# Patient Record
Sex: MALE | Race: WHITE
[De-identification: names, ages, dates, MRNs, and addresses within clinical notes are randomized per-mention and may not be internally consistent; named-entity substitution may affect disease eponyms.]

---

## 2020-03-19 ENCOUNTER — HOSPITAL ENCOUNTER (OUTPATIENT)
Dept: HOSPITAL 88 - OR | Age: 60
Discharge: HOME | End: 2020-03-19
Attending: INTERNAL MEDICINE
Payer: COMMERCIAL

## 2020-03-19 VITALS — DIASTOLIC BLOOD PRESSURE: 66 MMHG | SYSTOLIC BLOOD PRESSURE: 106 MMHG

## 2020-03-19 DIAGNOSIS — K64.8: ICD-10-CM

## 2020-03-19 DIAGNOSIS — K62.1: ICD-10-CM

## 2020-03-19 DIAGNOSIS — G47.33: ICD-10-CM

## 2020-03-19 DIAGNOSIS — D12.3: ICD-10-CM

## 2020-03-19 DIAGNOSIS — I10: ICD-10-CM

## 2020-03-19 DIAGNOSIS — F17.210: ICD-10-CM

## 2020-03-19 DIAGNOSIS — K57.30: ICD-10-CM

## 2020-03-19 DIAGNOSIS — K59.00: ICD-10-CM

## 2020-03-19 DIAGNOSIS — Z01.810: ICD-10-CM

## 2020-03-19 DIAGNOSIS — M54.2: ICD-10-CM

## 2020-03-19 DIAGNOSIS — M54.9: ICD-10-CM

## 2020-03-19 DIAGNOSIS — Z09: Primary | ICD-10-CM

## 2020-03-19 DIAGNOSIS — F32.9: ICD-10-CM

## 2020-03-19 PROCEDURE — 93005 ELECTROCARDIOGRAM TRACING: CPT

## 2020-03-19 PROCEDURE — 45384 COLONOSCOPY W/LESION REMOVAL: CPT

## 2020-03-19 PROCEDURE — 45378 DIAGNOSTIC COLONOSCOPY: CPT

## 2020-03-19 PROCEDURE — 45385 COLONOSCOPY W/LESION REMOVAL: CPT

## 2020-03-19 NOTE — OPERATIVE REPORT
DATE OF PROCEDURE:  03/19/2020

 

SURGEON:  Carlos Schultz MD

 

PROCEDURE:  Colonoscopy with polypectomy.

 

INDICATIONS FOR PROCEDURE:  Surveillance colonoscopy, personal history of colon polyps.

 

MEDICATIONS:  The patient was done under MAC, please see anesthesiologist's note.

 

PROCEDURE IN DETAIL:  With the patient in the left lateral decubitus position, a

flexible fiberoptic Olympus colonoscope was inserted into the rectum with ease and

advanced all the way to the cecum.  There were some retained stools in the ascending and

the transverse colon.  The scope was then withdrawn slowly whatever was visualized the

mucosa overlying the cecum and ascending appeared to be within normal limits.  A minute

polyp was removed per hot biopsy forceps from the distal transverse colon.  Diverticular

disease was noted in the left colon.  One polyp was removed per hot snare polypectomy

from the descending colon.  One polyp was hot biopsied and hemoclipped in the sigmoid

colon.  One polyp was hot biopsied in the rectum.  The scope was then retroflexed into

the distal rectum and small internal hemorrhoids were noted, none of which was actively

bleeding.  The scope was then straightened out, it was subsequently withdrawn, and the

patient tolerated the procedure well. 

 

IMPRESSION:  

1. Suboptimal prep, primarily ascending and transverse colon.

2. Transverse colon polyp, hot biopsied.

3. Descending colon polyp, hot snared.

4. Diverticulosis.

5. Sigmoid colon polyp, hot biopsied and hemoclipped.

6. Rectal polyp, hot biopsied.

7. Internal hemorrhoids, none actively bleeding.

 

PLAN:  Follow up histology.  Initiate high-fiber, low-fat diet.  Initiate high-fiber

supplement.  The patient might benefit from a followup colonoscopy in 1 to 2 years. 

 

 

 

 

______________________________

Carlos Schultz MD

 

[facility] [place]

D:  03/19/2020 13:48:50

T:  03/19/2020 14:44:48

Job #:  [id number]

 

cc:            Chente Carballo DO

## 2020-03-19 NOTE — XMS REPORT
Patient Summary Document

                             Created on: 2020



TL LINO

External Reference #: [id number]

: 1960

Sex: Male



Demographics







                          Address                   [address] [place]  [address]

 

                          Home Phone                [phone]

 

                          Preferred Language        Unknown

 

                          Marital Status            Unknown

 

                          Synagogue Affiliation     Unknown

 

                          Race                      Unknown

 

                          Ethnic Group              Unknown





Author







                          Author                    [facility]

 

                          Address                   Unknown

 

                          Phone                     Unavailable







Care Team Providers







                    Care Team Member Name    Role                Phone

 

                    ROX KAN    Unavailable         Unavailable







Problems

This patient has no known problems.



Allergies, Adverse Reactions, Alerts

This patient has no known allergies or adverse reactions.



Medications

This patient has no known medications.



Encounters







             Start Date/Time    End Date/Time    Encounter Type    Admission Type    Attending Clinicians

                    Care Facility       Care Department     Encounter ID

 

          2018 06:44:00    2018 23:59:00    Outpatient    ROX BARILLAS    [facility]       SLEEP

                                        [id number]

## 2020-04-28 LAB
ALBUMIN SERPL-MCNC: 4.2 G/DL (ref 3.5–5)
ALBUMIN/GLOB SERPL: 1.2 {RATIO} (ref 0.8–2)
ALP SERPL-CCNC: 54 IU/L (ref 40–150)
ALT SERPL-CCNC: 28 IU/L (ref 0–55)
ANION GAP SERPL CALC-SCNC: 13.8 MMOL/L (ref 8–16)
BASOPHILS # BLD AUTO: 0.1 10*3/UL (ref 0–0.1)
BASOPHILS NFR BLD AUTO: 0.9 % (ref 0–1)
BUN SERPL-MCNC: 13 MG/DL (ref 7–26)
BUN/CREAT SERPL: 13 (ref 6–25)
CALCIUM SERPL-MCNC: 9.8 MG/DL (ref 8.4–10.2)
CHLORIDE SERPL-SCNC: 104 MMOL/L (ref 98–107)
CO2 SERPL-SCNC: 27 MMOL/L (ref 22–29)
DEPRECATED INR PLAS: 0.9
DEPRECATED NEUTROPHILS # BLD AUTO: 3.9 10*3/UL (ref 2.1–6.9)
EGFRCR SERPLBLD CKD-EPI 2021: > 60 ML/MIN (ref 60–?)
EOSINOPHIL # BLD AUTO: 0.1 10*3/UL (ref 0–0.4)
EOSINOPHIL NFR BLD AUTO: 1.9 % (ref 0–6)
ERYTHROCYTE [DISTWIDTH] IN CORD BLOOD: 12.8 % (ref 11.7–14.4)
GLOBULIN PLAS-MCNC: 3.4 G/DL (ref 2.3–3.5)
GLUCOSE SERPLBLD-MCNC: 94 MG/DL (ref 74–118)
HCT VFR BLD AUTO: 46 % (ref 38.2–49.6)
HGB BLD-MCNC: 16.1 G/DL (ref 14–18)
LYMPHOCYTES # BLD: 2.1 10*3/UL (ref 1–3.2)
LYMPHOCYTES NFR BLD AUTO: 31.7 % (ref 18–39.1)
MCH RBC QN AUTO: 30.1 PG (ref 28–32)
MCHC RBC AUTO-ENTMCNC: 35 G/DL (ref 31–35)
MCV RBC AUTO: 86 FL (ref 81–99)
MONOCYTES # BLD AUTO: 0.5 10*3/UL (ref 0.2–0.8)
MONOCYTES NFR BLD AUTO: 6.8 % (ref 4.4–11.3)
NEUTS SEG NFR BLD AUTO: 58.3 % (ref 38.7–80)
PLATELET # BLD AUTO: 189 X10E3/UL (ref 140–360)
POTASSIUM SERPL-SCNC: 4.8 MMOL/L (ref 3.5–5.1)
PROTHROMBIN TIME: 12.7 SECONDS (ref 11.9–14.5)
RBC # BLD AUTO: 5.35 X10E6/UL (ref 4.3–5.7)
SODIUM SERPL-SCNC: 140 MMOL/L (ref 136–145)

## 2020-05-01 ENCOUNTER — HOSPITAL ENCOUNTER (OUTPATIENT)
Dept: HOSPITAL 88 - CATH LAB | Age: 60
Discharge: HOME | End: 2020-05-01
Attending: INTERNAL MEDICINE
Payer: COMMERCIAL

## 2020-05-01 VITALS — SYSTOLIC BLOOD PRESSURE: 109 MMHG | DIASTOLIC BLOOD PRESSURE: 77 MMHG

## 2020-05-01 VITALS — DIASTOLIC BLOOD PRESSURE: 68 MMHG | SYSTOLIC BLOOD PRESSURE: 119 MMHG

## 2020-05-01 VITALS — SYSTOLIC BLOOD PRESSURE: 119 MMHG | DIASTOLIC BLOOD PRESSURE: 68 MMHG

## 2020-05-01 VITALS — DIASTOLIC BLOOD PRESSURE: 90 MMHG | SYSTOLIC BLOOD PRESSURE: 144 MMHG

## 2020-05-01 VITALS — DIASTOLIC BLOOD PRESSURE: 66 MMHG | SYSTOLIC BLOOD PRESSURE: 126 MMHG

## 2020-05-01 VITALS — SYSTOLIC BLOOD PRESSURE: 128 MMHG | DIASTOLIC BLOOD PRESSURE: 66 MMHG

## 2020-05-01 DIAGNOSIS — R94.39: ICD-10-CM

## 2020-05-01 DIAGNOSIS — G47.33: ICD-10-CM

## 2020-05-01 DIAGNOSIS — Z11.59: ICD-10-CM

## 2020-05-01 DIAGNOSIS — I10: ICD-10-CM

## 2020-05-01 DIAGNOSIS — I25.10: Primary | ICD-10-CM

## 2020-05-01 DIAGNOSIS — Z87.891: ICD-10-CM

## 2020-05-01 DIAGNOSIS — Q24.5: ICD-10-CM

## 2020-05-01 DIAGNOSIS — Z82.49: ICD-10-CM

## 2020-05-01 DIAGNOSIS — Z01.812: ICD-10-CM

## 2020-05-01 PROCEDURE — 87635 SARS-COV-2 COVID-19 AMP PRB: CPT

## 2020-05-01 PROCEDURE — 93458 L HRT ARTERY/VENTRICLE ANGIO: CPT

## 2020-05-01 PROCEDURE — 36415 COLL VENOUS BLD VENIPUNCTURE: CPT

## 2020-05-01 PROCEDURE — 85025 COMPLETE CBC W/AUTO DIFF WBC: CPT

## 2020-05-01 PROCEDURE — 85610 PROTHROMBIN TIME: CPT

## 2020-05-01 PROCEDURE — 80053 COMPREHEN METABOLIC PANEL: CPT

## 2020-05-01 PROCEDURE — 99152 MOD SED SAME PHYS/QHP 5/>YRS: CPT

## 2020-05-01 NOTE — NUR
1349 pm RECEIVING NOTE CATH LAB RECOVERY 
DEPT...............................................................





Bedside report received from YUMIKO Rebolledo. Identifierx2. Alert oriented and appropriate, PERRLA, 
respirations even and unlabored to room air. Pulses x4 entreaties equal and strong. Pedal 
pulses PT/DP X4 and marked. Cap fill brisk < 3 sec. Rt TR band decrease at 1445pm No gross 
issues pain,pallor, pressure or dysrhythmia.NSR NO fix Dr Craballo [facility]



Skin warm and dry integrity appears D/I IV 20g to left hand,100cchr via controller  presents 
healthy w/o s/s of infiltration or complaint. Abdomen soft and supple. pt offered toileting, 
denies need to urinate or defecate. No personal affects with patient.  Family brother in 
car.. Pt and family verbalizes understanding of POC.



Currently w/o complaint of pain or need. jose guadalupe/rn

## 2020-05-01 NOTE — NUR
1445 RADIAL Compression removal:     Initial  Cuff  volume   13   cc

             1445-2cc Removed  No hematoma/bleeding noted with normal  neurovascular 
function.



             1500 -5cc Removed  No hematoma/ bleeding noted with normal  neurovascular 
function.



             1515 -5cc Removed  No hematoma/bleeding noted with normal  neurovascular 
function.



              

Air removal completed. 

Stasis achieved sterile 2x2,Tegaderm,  Coban  dressing  No hematoma, bleeding noted with 
normal neurovascular function. Wrist splint in place. Pt instructed on POC.

Ds/Rn

## 2020-05-01 NOTE — NUR
1515 Pt meets DC criteria. Rt Tr band  assessed for s/s of complication and presence of 
hematoma. Skin warm, dry, no discolor, and pulses present. IV removed from left hand 100cc 
hr via iv controller. Distal tip appears intact. VS WNL. Pt denies pain, sob, or need at 
this time. Family at bedside. Review of discharge paperwork and follow up instructions. 
verbalized understanding. Pt to wheelchair and transported to front of hospital. Transferred 
to private vehicle under own strength w/o incident with DC paperwork in hand. - ds/rn

## 2020-06-17 NOTE — OPERATIVE REPORT
DATE OF PROCEDURE:  05/01/2020

 

SURGEON:  Israel Carballo DO

 

PROCEDURES PERFORMED:  

1. Conscious sedation, 26 minutes.

2. Selective coronary angiography x2.

 

PREPROCEDURE DIAGNOSIS:  Abnormal stress test.

 

POSTPROCEDURE DIAGNOSIS:  Abnormal stress test with mild coronary artery disease and

myocardial bridge. 

 

PROCEDURE DETAILS:  After informed consent was obtained, the patient was brought to the

cardiac catheterization laboratory in a fasting and nonsedated state.  Bilateral groins

were prepped and draped in the usual sterile fashion.  A 2% lidocaine was infiltrated

over the right wrist for local anesthesia.  Using micropuncture needle, the right radial

artery was accessed via modified Seldinger technique and a 6-Tajik sheath was placed.

Next, diagnostic coronary angiography was performed using a TIG-4 catheter.  The patient

tolerated the procedure well with no immediate complications and transferred back to his

room in stable condition. 

 

PROCEDURAL FINDINGS:  

1. Left main coronary artery is patent.

2. Left anterior descending coronary artery is patent.  There is proximal mid course.

There is a distal mild myocardial bridge present. 

3. Left circumflex coronary artery provides two obtuse marginal vessels with luminal

irregularities. 

4. Right coronary is a dominant vessel and provides the posterior descending of which it

has a 20% stenosis. 

 

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

 

RECOMMENDATIONS:  Continue medical management.

 

 

 

 

______________________________

Israel Carballo DO

 

[facility]

D:  06/17/2020 09:48:33

T:  06/17/2020 13:56:44

Job #:  [id number]